# Patient Record
(demographics unavailable — no encounter records)

---

## 2024-11-20 NOTE — PHYSICAL EXAM
[de-identified] : General Appearance / Station: Well developed, well nourished, in no acute distress  Orientation: Oriented to person, place, and time Gait & Station: Ambulates without assistive device Neurologic: Normal leg sensation  Cardiovascular: Warm extremity  Lymphatics: No lymphedema  Generalized Ligament Laxity: Normal  Stiffness: Normal   LUMBAR SPINE: Nontender  at lumbar spine Straight leg raise: Negative  Motor: 5/5 motor L2-S1 Sensation: Intact  L2-S1  RIGHT HIP: Range of motion: Painless  internal and external rotation of the hip. Strength: Within Normal Limits  Palpation: Nontender  at greater trochanter. Nontender  at SI joint Stinchfield: Negative  FADIR: Negative  LUPE: Negative   SYMPTOMATIC RIGHT KNEE: Alignment: VARUS  Skin: normal Effusion:small Quadriceps: normal . Range of motion: symmetrical but painful . PF crepitus: 1+. PF apprehension: none . Patella / Patella Tendon: nontender . Lachman's: negative  Valgus @ 30: negative. Varus @ 30: negative. Posterior drawer: negative. Palpation: TENDER AT MEDIAL JOINT LINE. Meniscus signs: MEDIAL JOINT LINE  ASYMPTOMATIC LEFT KNEE: Alignment: VARUS Skin: normal Effusion: none . Quadriceps: normal . Range of motion: symmetrical . PF crepitus: none . PF apprehension: none . Patella / Patella Tendon: nontender . Lachman's: negative  Valgus @ 30: negative. Varus @ 30: negative. Posterior drawer: negative. Palpation: nontender. Meniscus signs: negative . [de-identified] : EXAM: [id number] - MR KNEE RT  - ORDERED BY: CORINNA CASTORENA   PROCEDURE DATE:  11/06/2024    INTERPRETATION:  MRI OF THE RIGHT KNEE  CLINICAL INDICATION: Right knee pain. Evaluate for meniscus tear.. TECHNIQUE: Multiplanar, Multisequence MRI was obtained of the RIGHT knee. COMPARISON: None available.  FINDINGS:  CRUCIATE AND COLLATERAL LIGAMENTS: The ACL, PCL, MCL, and LCL complex are intact.  MEDIAL COMPARTMENT: Complex tear of the posterior horn of the medial meniscus. Extrusion of the body segment of the medial meniscus into the medial gutter. Broad-based high-grade cartilage loss along the weightbearing surface of the medial femoral condyle with areas of subchondral sclerosis and subchondral edema. Mild/moderate cartilage wear along the posterior nonweightbearing portion of the medial femoral condyle. Broad-based moderate to deep partial-thickness cartilage loss along the medial tibial plateau with areas of subchondral edema/subchondral sclerosis. Osteophyte formation.  LATERAL COMPARTMENT: Mild inner margin fraying of the anterior horn and anterior horn/body junction of lateral meniscus. Mild superficial fibrillation of the cartilage along the weightbearing surface of the lateral femoral condyle and along the inner aspect of the lateral tibial plateau. Early osteophyte formation.  PATELLOFEMORAL COMPARTMENT: Broad-based moderate partial-thickness cartilage loss along the lateral patellar facet. Broad-based moderate partial-thickness cartilage loss with areas of chondral fissuring along the medial and central trochlear cartilage. Osteophyte formation.  EXTENSOR MECHANISM: Intact.  SYNOVIUM/ JOINT FLUID: Moderate knee joint effusion.  BONE MARROW: No acute fracture. Other findings as above.  MUSCLES: Unremarkable.  NEUROVASCULAR STRUCTURES: Unremarkable.  SUBCUTANEOUS SOFT TISSUES: Mild pes anserine bursal fluid.  IMPRESSION:  Tricompartmental knee cartilage wear, worst in the medial compartment.  Medial meniscal tear.  Mild inner margin fraying of the anterior horn and anterior horn/body junction of the lateral meniscus.  Moderate knee joint effusion.  --- End of Report ---      ARSH CUNNINGHAM MD; Resident Radiologist This document has been electronically signed. MALVIN CHU M.D., ATTENDING RADIOLOGIST This document has been electronically signed. Nov 11 2024 10:36AM

## 2024-11-20 NOTE — HISTORY OF PRESENT ILLNESS
[de-identified] : Patient presents for evaluation of right knee pain.  Is been going on for the past 3 months she noticed swelling in the knee as well and she underwent both an MRI as well as a duplex.  MRI showed tricompartmental arthritis with degenerative medial lateral meniscus tears.  She is coming in for further evaluation.  Pain is 5-6 out of 10 in severity she is taking Naprosyn which mildly improves the pain.  She is a retired  and currently works  in [place] [employer].

## 2024-11-20 NOTE — DISCUSSION/SUMMARY
[de-identified] : I discussed nonoperative treatment options for their right knee arthritis with degenerative meniscus tear. We discussed nonoperative treatments options including activity modification, therapy, bracing, nonsteroidal anti-inflammatory medications, and injections. The patient would like to continue with nonoperative treatment and would like to proceed with activity modification, physical therapy,NSAIDS, steroid injection   I discussed with them that I often prescribe an anti-inflammatory that should be taken once a day with meals to decrease pain and expedite symptom relief. They should not take this while also taking Aleve (Naprosyn), Motrin/ Advil (Ibuprofen), Toradol (ketoralac). They must stop taking it if they develops stomach pain, increased bleeding or bruising and they should follow-up with their primary care doctor for routine blood work including kidney function to monitor its effect. While it Is not a habit-forming substance, it should only  be taken as needed and to discontinue use once symptoms have resolved.   My cumulative time spent on this patients visit included: Preparation for the visit, review of the medical records, review of pertinent diagnostic studies, examination and counseling of the patient on the above diagnosis, treatment plan and prognosis, orders of diagnostic tests, medications and/or appropriate procedures and documentation in the medical records of todays visit.

## 2024-11-20 NOTE — PROCEDURE
[Aspiration] : Aspiration [Injection] : Injection [Right] : of the right [Knee Joint] : knee joint [Effusion] : Effusion [Osteoarthritis] : Osteoarthritis [Inflammation] : Inflammation [Diagnostic] : Diagnostic [Joint Pain] : Joint pain [Patient] : patient [Risk] : risk [Benefits] : benefits [Alternatives] : alternatives [Bleeding] : bleeding [Infection] : infection [Verbal Consent Obtained] : verbal consent was obtained prior to the procedure [Ethyl Chloride Spray] : ethyl chloride spray was used as a topical anesthetic [Superior] : superior [20] : a 20-gauge [___ mL Fluid] : [unfilled] mL of [Yellow] : yellow [Clear] : clear [Same Needle/New Syringe] : the syringe was changed and the same needle was left in place and [1% Lidocaine___(mL)] : [unfilled] mL of 1% Lidocaine [Methylpred. 40mg/mL___(mL)] : [unfilled] mL of 40mg/mL methylprednisolone [Bandage Applied] : a bandage [Culture] : culture [Cell Count] : cell count [Gram Stain] : gram stain [Crystal Analysis] : crystal analysis [Tolerated Well] : The patient tolerated the procedure well [None] : none [FreeTextEntry1] : chloraprep

## 2025-01-18 NOTE — HISTORY OF PRESENT ILLNESS
[FreeTextEntry1] : Here for follow-up Pt went to ER 1/8/25 and was diagnosed with bibasilar pneumonia She was prescribed antibiotics and was discharged from the ER [de-identified] : Feeling much better

## 2025-01-18 NOTE — HISTORY OF PRESENT ILLNESS
[FreeTextEntry1] : Here for follow-up Pt went to ER 1/8/25 and was diagnosed with bibasilar pneumonia She was prescribed antibiotics and was discharged from the ER [de-identified] : Feeling much better

## 2025-01-18 NOTE — HISTORY OF PRESENT ILLNESS
[FreeTextEntry1] : Here for follow-up Pt went to ER 1/8/25 and was diagnosed with bibasilar pneumonia She was prescribed antibiotics and was discharged from the ER [de-identified] : Feeling much better

## 2025-02-28 NOTE — HISTORY OF PRESENT ILLNESS
[de-identified] : Patient presents for follow-up of bilateral knee pain.  She has previously undergone injections in the past which provide relief.  She has known bone-on-bone arthritis of bilateral knees worse in the medial aspect of the knee.  She had done physical therapy in the past but had to discontinue it due to an illness.

## 2025-02-28 NOTE — DISCUSSION/SUMMARY
[de-identified] : I discussed nonoperative treatment options for their bilateral knee arthritis. We discussed nonoperative treatments options including activity modification, therapy, bracing, nonsteroidal anti-inflammatory medications, and injections. The patient would like to continue with nonoperative treatment and would like to proceed with activity modification, NSAIDS, steroid injection, hyaluronic acid injection.   I discussed with them that I often prescribe an anti-inflammatory that should be taken once a day with meals to decrease pain and expedite symptom relief. They should not take this while also taking Aleve (Naprosyn), Motrin/ Advil (Ibuprofen), Toradol (ketoralac). They must stop taking it if they develops stomach pain, increased bleeding or bruising and they should follow-up with their primary care doctor for routine blood work including kidney function to monitor its effect. While it Is not a habit-forming substance, it should only  be taken as needed and to discontinue use once symptoms have resolved.  They will continue with over-the-counter medication   We discussed that when nonoperative treatment is no longer successful and their pain is severe enough that it is affecting their ability to perform activities of daily living, a joint replacement may help them.   My cumulative time spent on this patients visit included: Preparation for the visit, review of the medical records, review of pertinent diagnostic studies, examination and counseling of the patient on the above diagnosis, treatment plan and prognosis, orders of diagnostic tests, medications and/or appropriate procedures and documentation in the medical records of todays visit.

## 2025-02-28 NOTE — PHYSICAL EXAM
[de-identified] : General Appearance / Station: Well developed, well nourished, in no acute distress  Orientation: Oriented to person, place, and time Gait & Station: Ambulates without assistive device Neurologic: Normal leg sensation  Cardiovascular: Warm extremity  Lymphatics: No lymphedema  Generalized Ligament Laxity: Normal  Stiffness: Normal   LUMBAR SPINE: Nontender  at lumbar spine Straight leg raise: Negative  Motor: 5/5 motor L2-S1 Sensation: Intact  L2-S1  RIGHT HIP: Range of motion: Painless  internal and external rotation of the hip. Strength: Within Normal Limits  Palpation: Nontender  at greater trochanter. Nontender  at SI joint Stinchfield: Negative  FADIR: Negative  LUPE: Negative   SYMPTOMATIC RIGHT KNEE: Alignment: VARUS  Skin: normal Effusion:small Quadriceps: normal . Range of motion: symmetrical but painful . PF crepitus: 1+. PF apprehension: none . Patella / Patella Tendon: nontender . Lachman's: negative  Valgus @ 30: negative. Varus @ 30: negative. Posterior drawer: negative. Palpation: TENDER AT MEDIAL JOINT LINE. Meniscus signs: MEDIAL JOINT LINE  ASYMPTOMATIC LEFT KNEE: Alignment: VARUS Skin: normal Effusion: none . Quadriceps: normal . Range of motion: symmetrical . PF crepitus: none . PF apprehension: none . Patella / Patella Tendon: nontender . Lachman's: negative  Valgus @ 30: negative. Varus @ 30: negative. Posterior drawer: negative. Palpation: nontender. Meniscus signs: negative . [de-identified] : Imaging: Standing 4 views of the right knee show right knee severe arthritis worse in the medial compartment with loss of joint space, subchondral sclerosis, marginal osteophyte formations, and subchondral cyst formation. There are no signs of fracture. There is a small knee effusion. The soft tissues appear unremarkable.  Imaging: Standing 4 views of the left knee show left knee severe arthritis worse in the medial compartment with loss of joint space, subchondral sclerosis, marginal osteophyte formations, and subchondral cyst formation. There are no signs of fracture. There is a small knee effusion. The soft tissues appear unremarkable.

## 2025-02-28 NOTE — PROCEDURE
[de-identified] : Injection: Right knee joint. Indication: Osteoarthritis.   A discussion was had with the patient regarding this procedure and all questions were answered. All risks, benefits and alternatives were discussed. These included but were not limited to bleeding, infection, allergic reaction and reaccumulation of fluid. A timeout was done to ensure correct side and patient agreed to the procedure.  A Augustine daivd was created on the skin utilizing a plastic needle cap to david the anticipated point of entry. Alcohol was used to clean the skin, and chloraprep was used to sterilize and prep the area in the lateral joint line aspect of the knee. Ethyl chloride spray was then used as a topical anesthetic. A 22-gauge needle was used to inject 4cc 1% lidocaine without epinephrine  and 1cc of 40mg/ml methylprednisolone into the knee. A sterile bandage was then applied. The patient tolerated the procedure well.   Injection: Left knee joint. Indication: Osteoarthritis.   A discussion was had with the patient regarding this procedure and all questions were answered. All risks, benefits and alternatives were discussed. These included but were not limited to bleeding, infection, allergic reaction and reaccumulation of fluid. A timeout was done to ensure correct side and patient agreed to the procedure.  A Augustine david was created on the skin utilizing a plastic needle cap to david the anticipated point of entry. Alcohol was used to clean the skin, and chloraprep was used to sterilize and prep the area in the lateral joint line aspect of the knee. Ethyl chloride spray was then used as a topical anesthetic. A 22-gauge needle was used to inject 4cc 1% lidocaine without epinephrine,  and 1cc of 40mg/ml methylprednisolone into the knee. A sterile bandage was then applied. The patient tolerated the procedure well.

## 2025-04-10 NOTE — HISTORY OF PRESENT ILLNESS
[de-identified] : GISELLE COOLEY  is an 52 year-old female presents today with a chief complaint of chronic bilateral knee pain that has failed other conservative treatments. We discussed hyaluronic acid injection at our previous visit and was granted insurance approval. The patient presents for a knee hyaluronic acid injection

## 2025-04-10 NOTE — DISCUSSION/SUMMARY
[de-identified] : Patient was seen today for continued management of chronic knee pain with recent atraumatic exacerbation. We discussed various treatment options as well as associated risk/benefits/alternatives and patient elected to proceed with hyaluronic acid injection therapy. The bilateral injections was given today under sterile conditions into the  knee joint without complication (see procedure note). The patient was instructed on modification of activities over the next 48-72 hours. I advised the patient to ice the knee as needed for control of local irritation from the injection. I advised that some patients have immediate benefit from the initial injection therapy, however it usually takes the medication a number of weeks (~8wks) to provide significant relief of osteoarthritic symptoms.   Follow-up in 6 weeks for repeat evaluation.  This note was generated using dragon medical dictation software.  A reasonable effort has been made for proofreading its contents, but typos may still remain.  If there are any questions or points of clarification needed please notify my office.

## 2025-04-10 NOTE — PROCEDURE
[de-identified] : Injection: Left knee joint. Indication: Osteoarthritis.  A discussion was had with the patient regarding this procedure and all questions were answered. All risks, benefits and alternatives were discussed. These included but were not limited to bleeding, infection, and allergic reaction. A timeout was done to ensure correct side and patient agreed to the procedure.  A [place] david was created on the skin utilizing a plastic needle cap to david the anticipated point of entry. Alcohol was used to clean the skin, and betadine was used to sterilize and prep the area in the lateral joint line aspect of the knee. Ethyl chloride spray was then used as a topical anesthetic. A 22-gauge needle was used to inject 4 cc of Monovisc into the knee with ease. A sterile bandage was then applied. The patient tolerated the procedure well and there were no complications.    Injection: Right knee joint. Indication: Osteoarthritis.  A discussion was had with the patient regarding this procedure and all questions were answered. All risks, benefits and alternatives were discussed. These included but were not limited to bleeding, infection, and allergic reaction. A timeout was done to ensure correct side and patient agreed to the procedure.  A [place] david was created on the skin utilizing a plastic needle cap to david the anticipated point of entry. Alcohol was used to clean the skin, and betadine was used to sterilize and prep the area in the lateral joint line aspect of the knee. Ethyl chloride spray was then used as a topical anesthetic. A 22-gauge needle was used to inject 4 cc of Monovisc into the knee with ease. A sterile bandage was then applied. The patient tolerated the procedure well and there were no complications.

## 2025-05-23 NOTE — PHYSICAL EXAM
[de-identified] : Constitutional: Well-nourished, well-developed, No acute distress  Respiratory:  Good respiratory effort, no SOB  Psychiatric: Pleasant and normal affect, alert and oriented x3 Skin: Clean dry and intact  B/L UE Musculoskeletal: normal except where as noted in regional exam     Right Shoulder:  APPEARANCE: no marked deformities, no swelling or malalignment  POSITIVE TENDERNESS: supraspinatus, long head biceps tendon  NONTENDER:  infraspinatus, teres minor. biceps. anterior and posterior capsule. AC joint.   ROM: full with mild painful arc past 60 degrees, no scapular winging or dyskinesia present  RESISTIVE TESTING: MMT 4+/5 ER, Flexion and Empty can, 5/5 IR. painless 5/5 resisted ext, horizontal abd/add   SPECIAL TESTS: + Valdes and Neers, mildly + cross arm adduction, + Speeds, neg Tompkins's, neg Drop Arm, neg Apprehension. [de-identified] : Imaging: 3 views of the right shoulder were obtained today that show no fracture.  There is no malalignment. There is no joint dislocation, or degenerative changes seen. No other obvious osseous abnormality. Otherwise unremarkable.

## 2025-05-23 NOTE — PROCEDURE
[de-identified] :  Procedure: Injection of the [right glenohumeral joint. Indication:  right rotator cuff tendinitis Potential complications include bleeding, infection and allergic reaction. Risk, benefits and alternatives were discussed with the patient. Verbal consent was obtained prior to the procedure. ChloraPrep was used to prep the area. ethyl chloride spray was used as a topical anesthetic. Using sterile technique, the aspiration/injection needle was then directed from a posterior aspect. A 20-gauge was used to inject 4 mL of 1% Lidocaine and 2 mL 40mg/mL methylprednisolone. A bandage was applied. The patient tolerated the procedure well. Complications: none.

## 2025-05-23 NOTE — HISTORY OF PRESENT ILLNESS
[de-identified] : Patient presents for evaluation of right shoulder pain that is 3 out of 10 in severity worse at night and with overhead elevation.  She feels that the pain also has an associated weakness.  She has seen me previously for injections in bilateral knees for knee arthritis on 4/10/2025 which are working well

## 2025-05-23 NOTE — DISCUSSION/SUMMARY
[de-identified] : GISELLE COOLEY  is an 52 year-year-old female who presents to the clinic with right shoulder pain.  The symptoms began after a traumatic injury. The patient initially tried OTC medications/NSAIDs with some relief, even though short lasting. Exercise therapy has had only temporary relief.  Radiographic findings show no obvious fracture or deformity,however patient has had continued pain and is unable to return to their usual activities. Given the history along with the physical exam findings of  joint line pain and pain with motion, I am concerned about a possible rotator cuff . I discussed that radiographs show the bones well but do not show the soft tissues, such as ligaments, tendons and labrum well. At this point, the patient is quite debilitated by her  pain and is unable to return to her  activities of daily living such as lifting her arm. Given the persistent symptoms, I discussed with the patient that her should continue with a structured home exercise protocol and avoid strenuous activities while we obtain an MRI to further evaluate for an internal derangement of the shoulder. The office will work to obtain the MRI.   I discussed with them that I often recommend an anti-inflammatory (Meloxicam)that should be taken once a day with meals to decrease pain and expedite symptom relief. They should not take this while also taking Aleve (Naprosyn), Motrin/ Advil (Ibuprofen), Toradol (ketoralac). They must stop taking it if they develops stomach pain, increased bleeding or bruising and they should follow-up with their primary care doctor for routine blood work including kidney function to monitor its effect. While it Is not a habit-forming substance, it should only  be taken as needed and to discontinue use once symptoms have resolved.   . They will schedule follow-up for in person review of the MRI results. They know to call the office or present to the ER if they develop worsening pain, swelling, numbness, tingling, inability to use the arm.   My cumulative time spent on this patients visit included: Preparation for the visit, review of the medical records, review of pertinent diagnostic studies, examination and counseling of the patient on the above diagnosis, treatment plan and prognosis, orders of diagnostic tests, medications and/or appropriate procedures and documentation in the medical records of todays visit.

## 2025-07-09 NOTE — PHYSICAL EXAM
[de-identified] : Constitutional: Well-nourished, well-developed, No acute distress  Respiratory:  Good respiratory effort, no SOB  Psychiatric: Pleasant and normal affect, alert and oriented x3 Skin: Clean dry and intact  B/L UE Musculoskeletal: normal except where as noted in regional exam     Right Shoulder:  APPEARANCE: no marked deformities, no swelling or malalignment  POSITIVE TENDERNESS: supraspinatus, long head biceps tendon  NONTENDER:  infraspinatus, teres minor. biceps. anterior and posterior capsule. AC joint.   ROM: full with mild painful arc past 60 degrees, no scapular winging or dyskinesia present  RESISTIVE TESTING: MMT 4+/5 ER, Flexion and Empty can, 5/5 IR. painless 5/5 resisted ext, horizontal abd/add   SPECIAL TESTS: + Valdes and Neers, mildly + cross arm adduction, + Speeds, neg Tompkins's, neg Drop Arm, neg Apprehension. [de-identified] : MRI of the right shoulder dated 6/17/2025 from [facility] radiology undersurface tear involving the preinsertional fibers of the mid to anterior supraspinatus tendon no full-thickness tear grade 1 strain of the infraspinatus thickening of the inferior joint capsule with possible adhesive capsulitis.  Mild biceps tendinosis moderate amount of fluid of the biceps sheath

## 2025-07-09 NOTE — DISCUSSION/SUMMARY
[de-identified] : GISELLE COOLEY  is an 52 year-year-old female with Rt partial RC tear.    Patient will proceed with: - PT Rx  - Evaluation by my shoulder left shoulder colleague   I discussed with them that I often recommend an anti-inflammatory (Meloxicam)that should be taken once a day with meals to decrease pain and expedite symptom relief. They should not take this while also taking Aleve (Naprosyn), Motrin/ Advil (Ibuprofen), Toradol (ketoralac). They must stop taking it if they develops stomach pain, increased bleeding or bruising and they should follow-up with their primary care doctor for routine blood work including kidney function to monitor its effect. While it Is not a habit-forming substance, it should only  be taken as needed and to discontinue use once symptoms have resolved.  They prefer to continue without complication   My cumulative time spent on this patients visit included: Preparation for the visit, review of the medical records, review of pertinent diagnostic studies, examination and counseling of the patient on the above diagnosis, treatment plan and prognosis, orders of diagnostic tests, medications and/or appropriate procedures and documentation in the medical records of todays visit.

## 2025-07-09 NOTE — DISCUSSION/SUMMARY
[de-identified] : GISELLE COOLEY  is an 52 year-year-old female with Rt partial RC tear.    Patient will proceed with: - PT Rx  - Evaluation by my shoulder left shoulder colleague   I discussed with them that I often recommend an anti-inflammatory (Meloxicam)that should be taken once a day with meals to decrease pain and expedite symptom relief. They should not take this while also taking Aleve (Naprosyn), Motrin/ Advil (Ibuprofen), Toradol (ketoralac). They must stop taking it if they develops stomach pain, increased bleeding or bruising and they should follow-up with their primary care doctor for routine blood work including kidney function to monitor its effect. While it Is not a habit-forming substance, it should only  be taken as needed and to discontinue use once symptoms have resolved.  They prefer to continue without complication   My cumulative time spent on this patients visit included: Preparation for the visit, review of the medical records, review of pertinent diagnostic studies, examination and counseling of the patient on the above diagnosis, treatment plan and prognosis, orders of diagnostic tests, medications and/or appropriate procedures and documentation in the medical records of todays visit.

## 2025-07-09 NOTE — HISTORY OF PRESENT ILLNESS
[de-identified] :  GISELLE COOLEY is a 52 year F presents for follow-up of right shoulder pain and MRI review.  Patient continues to have pain, cortisone injection did not offer much relief.  She does have decreased range of motion in the right shoulder and pain is 8 out of 10 in severity.

## 2025-07-09 NOTE — HISTORY OF PRESENT ILLNESS
[de-identified] :  GISELLE COOLEY is a 52 year F presents for follow-up of right shoulder pain and MRI review.  Patient continues to have pain, cortisone injection did not offer much relief.  She does have decreased range of motion in the right shoulder and pain is 8 out of 10 in severity.

## 2025-07-09 NOTE — PROCEDURE
[de-identified] :  Procedure: Injection of the [right glenohumeral joint. Indication:  right rotator cuff tendinitis Potential complications include bleeding, infection and allergic reaction. Risk, benefits and alternatives were discussed with the patient. Verbal consent was obtained prior to the procedure. ChloraPrep was used to prep the area. ethyl chloride spray was used as a topical anesthetic. Using sterile technique, the aspiration/injection needle was then directed from a posterior aspect. A 20-gauge was used to inject 4 mL of 1% Lidocaine and 2 mL 40mg/mL methylprednisolone. A bandage was applied. The patient tolerated the procedure well. Complications: none.

## 2025-07-09 NOTE — PHYSICAL EXAM
[de-identified] : Constitutional: Well-nourished, well-developed, No acute distress  Respiratory:  Good respiratory effort, no SOB  Psychiatric: Pleasant and normal affect, alert and oriented x3 Skin: Clean dry and intact  B/L UE Musculoskeletal: normal except where as noted in regional exam     Right Shoulder:  APPEARANCE: no marked deformities, no swelling or malalignment  POSITIVE TENDERNESS: supraspinatus, long head biceps tendon  NONTENDER:  infraspinatus, teres minor. biceps. anterior and posterior capsule. AC joint.   ROM: full with mild painful arc past 60 degrees, no scapular winging or dyskinesia present  RESISTIVE TESTING: MMT 4+/5 ER, Flexion and Empty can, 5/5 IR. painless 5/5 resisted ext, horizontal abd/add   SPECIAL TESTS: + Valdes and Neers, mildly + cross arm adduction, + Speeds, neg Tompkins's, neg Drop Arm, neg Apprehension. [de-identified] : MRI of the right shoulder dated 6/17/2025 from [facility] radiology undersurface tear involving the preinsertional fibers of the mid to anterior supraspinatus tendon no full-thickness tear grade 1 strain of the infraspinatus thickening of the inferior joint capsule with possible adhesive capsulitis.  Mild biceps tendinosis moderate amount of fluid of the biceps sheath

## 2025-07-09 NOTE — PROCEDURE
[de-identified] :  Procedure: Injection of the [right glenohumeral joint. Indication:  right rotator cuff tendinitis Potential complications include bleeding, infection and allergic reaction. Risk, benefits and alternatives were discussed with the patient. Verbal consent was obtained prior to the procedure. ChloraPrep was used to prep the area. ethyl chloride spray was used as a topical anesthetic. Using sterile technique, the aspiration/injection needle was then directed from a posterior aspect. A 20-gauge was used to inject 4 mL of 1% Lidocaine and 2 mL 40mg/mL methylprednisolone. A bandage was applied. The patient tolerated the procedure well. Complications: none.

## 2025-07-10 NOTE — PHYSICAL EXAM
[de-identified] :  Gen: NAD Resp: Nonlabored respirations, no SOB   Shoulder: Skin intact Tender over posterolateral deltoid 170/170/60/lumbar AROM 5/5 ER IR 4p/5 Abduction (+) Christel/Valdes (-) Belly press/bear hug (+) Speed/yergason 5/5 D B T EPL FDP IO SILT amur 2+ rad bcr   1+ ant/post instability (-) O'kaylyn's (-) load and release (-) apprehension (-) Arthur Casey (-) sulcus sign (-) AC pain, cross body adduction  [de-identified] : MRI - articular partial rc tear supra/upper border ssc

## 2025-07-10 NOTE — DISCUSSION/SUMMARY
[de-identified] : 52yF pw R partial articular RC tear.  The patient was extensively counseled on treatment options including but not limited to observation, rest/activity modification, bracing, anti-inflammatory medications, physical therapy, injections, and surgery.  The natural history of the disease was thoroughly explained.  We discussed that the majority of the time, this condition can be initially treated conservatively. The patient will proceed with: -PT -diclofenac rx provided - pt instructed to take with meals and not with other nsaid's including advil, aleve, and toradol.  The pt understands to stop taking the medication if any stomach sx develop or they develop any type of bleeding or bruising, at which point they will present to their PMD -pt was instructed on the importance of resting, icing and elevating to minimize swelling -RTC 6w - can consider PRP   I have personally obtained the history, reviewed the ROS as noted, and performed the physical examination today.  The patient and I discussed the assessment and options and developed the plan.  All questions were answered and the patient stated their understanding of the treatment plan and appreciation of the visit.   My cumulative time spent on this patient's visit included: Preparation for the visit, review of the medical records, review of pertinent diagnostic studies, examination and counseling of the patient on the above diagnosis, treatment plan and prognosis, orders of diagnostic tests, medications and/or appropriate procedures and documentation in the medical records of today's visit.   Rajan Goyal MD

## 2025-07-10 NOTE — HISTORY OF PRESENT ILLNESS
[de-identified] : Geoff is a 51yo F pw R shoulder pain ongoing for over 3 months. Previously seen and treated by Dr. Acosta. Pt complains of severe pain and decrease in range of motion. Pain at worse 8 out of 10. Pt also reports difficulty with lifting arm and weakness. MRI in June show partial RTC tear. She has trialed cortisone injection and physical therapy. Temporary relief reported with physical therapy but no improvements in symptoms with injections. Referred by Dr. Acosta for further evaluation.